# Patient Record
Sex: OTHER/UNKNOWN | Race: WHITE | NOT HISPANIC OR LATINO | Employment: OTHER | URBAN - METROPOLITAN AREA
[De-identification: names, ages, dates, MRNs, and addresses within clinical notes are randomized per-mention and may not be internally consistent; named-entity substitution may affect disease eponyms.]

---

## 2023-07-08 ENCOUNTER — HOSPITAL ENCOUNTER (INPATIENT)
Facility: HOSPITAL | Age: 88
LOS: 1 days | DRG: 184 | End: 2023-07-09
Attending: STUDENT IN AN ORGANIZED HEALTH CARE EDUCATION/TRAINING PROGRAM | Admitting: SURGERY
Payer: MEDICARE

## 2023-07-08 DIAGNOSIS — S32.9XXA CLOSED NONDISPLACED FRACTURE OF PELVIS, UNSPECIFIED PART OF PELVIS, INITIAL ENCOUNTER: Primary | ICD-10-CM

## 2023-07-08 DIAGNOSIS — S32.10XA CLOSED FRACTURE OF SACRUM, UNSPECIFIED PORTION OF SACRUM, INITIAL ENCOUNTER: ICD-10-CM

## 2023-07-08 DIAGNOSIS — S32.810A MULTIPLE FRACTURES OF PELVIS WITH STABLE DISRUPTION OF PELVIC RING, INITIAL ENCOUNTER FOR CLOSED FRACTURE: ICD-10-CM

## 2023-07-08 DIAGNOSIS — S22.49XA RIB FRACTURES: ICD-10-CM

## 2023-07-08 DIAGNOSIS — S32.599A: ICD-10-CM

## 2023-07-08 LAB
ABORH RETYPE: NORMAL
ALBUMIN SERPL-MCNC: 3.7 G/DL (ref 3.4–4.8)
ALBUMIN/GLOB SERPL: 1.2 RATIO (ref 1.1–2)
ALP SERPL-CCNC: 234 UNIT/L (ref 40–150)
ALT SERPL-CCNC: 11 UNIT/L (ref 0–55)
APTT PPP: 36.3 SECONDS (ref 23.2–33.7)
AST SERPL-CCNC: 19 UNIT/L (ref 5–34)
BASOPHILS # BLD AUTO: 0.02 X10(3)/MCL
BASOPHILS NFR BLD AUTO: 0.2 %
BILIRUBIN DIRECT+TOT PNL SERPL-MCNC: 1.2 MG/DL
BUN SERPL-MCNC: 12.7 MG/DL (ref 8.4–25.7)
CALCIUM SERPL-MCNC: 9.4 MG/DL (ref 8.8–10)
CHLORIDE SERPL-SCNC: 100 MMOL/L (ref 98–111)
CO2 SERPL-SCNC: 21 MMOL/L (ref 23–31)
CREAT SERPL-MCNC: 0.72 MG/DL (ref 0.73–1.18)
EOSINOPHIL # BLD AUTO: 0.13 X10(3)/MCL (ref 0–0.9)
EOSINOPHIL NFR BLD AUTO: 1.4 %
ERYTHROCYTE [DISTWIDTH] IN BLOOD BY AUTOMATED COUNT: 14.8 % (ref 11.5–17)
ETHANOL SERPL-MCNC: <10 MG/DL
GFR SERPLBLD CREATININE-BSD FMLA CKD-EPI: >60 MLS/MIN/1.73/M2
GLOBULIN SER-MCNC: 3.2 GM/DL (ref 2.4–3.5)
GLUCOSE SERPL-MCNC: 116 MG/DL (ref 75–121)
GROUP & RH: NORMAL
HCT VFR BLD AUTO: 38.6 % (ref 42–52)
HGB BLD-MCNC: 12.9 G/DL (ref 14–18)
IMM GRANULOCYTES # BLD AUTO: 0.04 X10(3)/MCL (ref 0–0.04)
IMM GRANULOCYTES NFR BLD AUTO: 0.4 %
INDIRECT COOMBS GEL: NORMAL
INR BLD: 1.75 (ref 0–1.3)
LACTATE SERPL-SCNC: 1.6 MMOL/L (ref 0.5–2.2)
LACTATE SERPL-SCNC: 1.7 MMOL/L (ref 0.5–2.2)
LYMPHOCYTES # BLD AUTO: 2.89 X10(3)/MCL (ref 0.6–4.6)
LYMPHOCYTES NFR BLD AUTO: 30.5 %
MCH RBC QN AUTO: 28 PG (ref 27–31)
MCHC RBC AUTO-ENTMCNC: 33.4 G/DL (ref 33–36)
MCV RBC AUTO: 83.7 FL (ref 80–94)
MONOCYTES # BLD AUTO: 0.4 X10(3)/MCL (ref 0.1–1.3)
MONOCYTES NFR BLD AUTO: 4.2 %
NEUTROPHILS # BLD AUTO: 5.98 X10(3)/MCL (ref 2.1–9.2)
NEUTROPHILS NFR BLD AUTO: 63.3 %
NRBC BLD AUTO-RTO: 0 %
PLATELET # BLD AUTO: 309 X10(3)/MCL (ref 130–400)
PMV BLD AUTO: 9.6 FL (ref 7.4–10.4)
POTASSIUM SERPL-SCNC: 3.5 MMOL/L (ref 3.5–5.1)
PROT SERPL-MCNC: 6.9 GM/DL (ref 5.8–7.6)
PROTHROMBIN TIME: 20.2 SECONDS (ref 12.5–14.5)
RBC # BLD AUTO: 4.61 X10(6)/MCL
SODIUM SERPL-SCNC: 136 MMOL/L (ref 132–146)
SPECIMEN OUTDATE: NORMAL
WBC # SPEC AUTO: 9.46 X10(3)/MCL (ref 4.5–11.5)

## 2023-07-08 PROCEDURE — 21400001 HC TELEMETRY ROOM

## 2023-07-08 PROCEDURE — 85730 THROMBOPLASTIN TIME PARTIAL: CPT

## 2023-07-08 PROCEDURE — 25000003 PHARM REV CODE 250

## 2023-07-08 PROCEDURE — 99285 EMERGENCY DEPT VISIT HI MDM: CPT | Mod: 25

## 2023-07-08 PROCEDURE — 82077 ASSAY SPEC XCP UR&BREATH IA: CPT | Performed by: STUDENT IN AN ORGANIZED HEALTH CARE EDUCATION/TRAINING PROGRAM

## 2023-07-08 PROCEDURE — G0390 TRAUMA RESPONS W/HOSP CRITI: HCPCS

## 2023-07-08 PROCEDURE — 86900 BLOOD TYPING SEROLOGIC ABO: CPT | Performed by: STUDENT IN AN ORGANIZED HEALTH CARE EDUCATION/TRAINING PROGRAM

## 2023-07-08 PROCEDURE — 83605 ASSAY OF LACTIC ACID: CPT

## 2023-07-08 PROCEDURE — 63600175 PHARM REV CODE 636 W HCPCS: Performed by: STUDENT IN AN ORGANIZED HEALTH CARE EDUCATION/TRAINING PROGRAM

## 2023-07-08 PROCEDURE — 11000001 HC ACUTE MED/SURG PRIVATE ROOM

## 2023-07-08 PROCEDURE — 80053 COMPREHEN METABOLIC PANEL: CPT | Performed by: STUDENT IN AN ORGANIZED HEALTH CARE EDUCATION/TRAINING PROGRAM

## 2023-07-08 PROCEDURE — 85610 PROTHROMBIN TIME: CPT

## 2023-07-08 PROCEDURE — 63600175 PHARM REV CODE 636 W HCPCS

## 2023-07-08 PROCEDURE — 25500020 PHARM REV CODE 255: Performed by: STUDENT IN AN ORGANIZED HEALTH CARE EDUCATION/TRAINING PROGRAM

## 2023-07-08 PROCEDURE — 83605 ASSAY OF LACTIC ACID: CPT | Performed by: STUDENT IN AN ORGANIZED HEALTH CARE EDUCATION/TRAINING PROGRAM

## 2023-07-08 PROCEDURE — 85025 COMPLETE CBC W/AUTO DIFF WBC: CPT | Performed by: STUDENT IN AN ORGANIZED HEALTH CARE EDUCATION/TRAINING PROGRAM

## 2023-07-08 PROCEDURE — 96374 THER/PROPH/DIAG INJ IV PUSH: CPT

## 2023-07-08 RX ORDER — OXYCODONE HYDROCHLORIDE 10 MG/1
10 TABLET ORAL EVERY 4 HOURS PRN
Status: DISCONTINUED | OUTPATIENT
Start: 2023-07-08 | End: 2023-07-09 | Stop reason: HOSPADM

## 2023-07-08 RX ORDER — ONDANSETRON 2 MG/ML
INJECTION INTRAMUSCULAR; INTRAVENOUS CODE/TRAUMA/SEDATION MEDICATION
Status: COMPLETED | OUTPATIENT
Start: 2023-07-08 | End: 2023-07-08

## 2023-07-08 RX ORDER — ACETAMINOPHEN 325 MG/1
650 TABLET ORAL EVERY 4 HOURS
Status: DISCONTINUED | OUTPATIENT
Start: 2023-07-08 | End: 2023-07-09 | Stop reason: HOSPADM

## 2023-07-08 RX ORDER — ONDANSETRON 2 MG/ML
INJECTION INTRAMUSCULAR; INTRAVENOUS
Status: DISPENSED
Start: 2023-07-08 | End: 2023-07-09

## 2023-07-08 RX ORDER — ENOXAPARIN SODIUM 100 MG/ML
40 INJECTION SUBCUTANEOUS EVERY 12 HOURS
Status: DISCONTINUED | OUTPATIENT
Start: 2023-07-08 | End: 2023-07-09

## 2023-07-08 RX ORDER — METHOCARBAMOL 500 MG/1
500 TABLET, FILM COATED ORAL 3 TIMES DAILY
Status: DISCONTINUED | OUTPATIENT
Start: 2023-07-08 | End: 2023-07-09 | Stop reason: HOSPADM

## 2023-07-08 RX ORDER — SODIUM CHLORIDE, SODIUM LACTATE, POTASSIUM CHLORIDE, CALCIUM CHLORIDE 600; 310; 30; 20 MG/100ML; MG/100ML; MG/100ML; MG/100ML
INJECTION, SOLUTION INTRAVENOUS CONTINUOUS
Status: DISCONTINUED | OUTPATIENT
Start: 2023-07-08 | End: 2023-07-09

## 2023-07-08 RX ORDER — OXYCODONE HYDROCHLORIDE 5 MG/1
5 TABLET ORAL EVERY 4 HOURS PRN
Status: DISCONTINUED | OUTPATIENT
Start: 2023-07-08 | End: 2023-07-09 | Stop reason: HOSPADM

## 2023-07-08 RX ORDER — KETOROLAC TROMETHAMINE 30 MG/ML
15 INJECTION, SOLUTION INTRAMUSCULAR; INTRAVENOUS EVERY 6 HOURS
Status: DISCONTINUED | OUTPATIENT
Start: 2023-07-09 | End: 2023-07-09 | Stop reason: HOSPADM

## 2023-07-08 RX ADMIN — SODIUM CHLORIDE, POTASSIUM CHLORIDE, SODIUM LACTATE AND CALCIUM CHLORIDE: 600; 310; 30; 20 INJECTION, SOLUTION INTRAVENOUS at 09:07

## 2023-07-08 RX ADMIN — IOPAMIDOL 100 ML: 755 INJECTION, SOLUTION INTRAVENOUS at 05:07

## 2023-07-08 RX ADMIN — ONDANSETRON 4 MG: 2 INJECTION INTRAMUSCULAR; INTRAVENOUS at 06:07

## 2023-07-08 RX ADMIN — ACETAMINOPHEN 650 MG: 325 TABLET, FILM COATED ORAL at 11:07

## 2023-07-08 RX ADMIN — SODIUM CHLORIDE, POTASSIUM CHLORIDE, SODIUM LACTATE AND CALCIUM CHLORIDE: 600; 310; 30; 20 INJECTION, SOLUTION INTRAVENOUS at 07:07

## 2023-07-08 RX ADMIN — ENOXAPARIN SODIUM 40 MG: 40 INJECTION SUBCUTANEOUS at 08:07

## 2023-07-08 RX ADMIN — METHOCARBAMOL 500 MG: 500 TABLET ORAL at 08:07

## 2023-07-08 NOTE — ED PROVIDER NOTES
Encounter Date: 7/8/2023    SCRIBE #1 NOTE: I, Deya Filiberto, am scribing for, and in the presence of,  Dante Hernandez MD. I have scribed the following portions of the note - Other sections scribed: HPI, ROS, PE.     History   No chief complaint on file.  Trauma, fall    89 year old female presents to the ED via EMS activated as a level 2 trauma from a nursing home following a ground level fall. Per EMS report, the fall occurred when the patient tripped and fell while trying to get a sheet off of a shelf. Patient reports striking the back of her head in the fall. Per EMS, the nursing home staff reported the pt at a GCS of 14 when she was found and stated that she was confused where she was. EMS report a GCS of 15. A c-collar was placed en route. Patient reports lower back pain and head pain. She denies any neck pain. The patient is on Eliquis.    The history is provided by the patient and the EMS personnel. No  was used.   Fall  The accident occurred just prior to arrival. The fall occurred while standing. The point of impact was the head. The pain is present in the back and head. Associated symptoms include back pain. Pertinent negatives include no neck pain, no fever and no abdominal pain.       Review of patient's allergies indicates:  Not on File  History reviewed. No pertinent past medical history.  History reviewed. No pertinent surgical history.  History reviewed. No pertinent family history.     Review of Systems   Constitutional:  Negative for fever.   HENT:  Negative for sore throat.         Head pain   Eyes:  Negative for visual disturbance.   Respiratory:  Negative for shortness of breath.    Cardiovascular:  Negative for chest pain.   Gastrointestinal:  Negative for abdominal pain.   Genitourinary:  Negative for dysuria.   Musculoskeletal:  Positive for back pain. Negative for joint swelling and neck pain.   Skin:  Negative for rash.   Neurological:  Negative for weakness.    Psychiatric/Behavioral:  Negative for confusion.      Physical Exam     Initial Vitals [07/08/23 1730]   BP Pulse Resp Temp SpO2   (!) 153/91 88 20 97.3 °F (36.3 °C) 97 %      MAP       --         Physical Exam    Nursing note and vitals reviewed.  Constitutional: Carmen Bourne appears well-developed and well-nourished.   HENT:   Head: Normocephalic.   3-4 cm hematoma to the left posterior scalp.   Eyes: EOM are normal. Pupils are equal, round, and reactive to light.   5 mm reactive to light bilaterally   Neck:   Normal range of motion.  Cardiovascular:  Normal rate, regular rhythm, normal heart sounds and intact distal pulses.           No murmur heard.  Pulmonary/Chest: Breath sounds normal. No respiratory distress. Carmen Bourne has no wheezes. Carmen Bourne has no rales.   Pacemaker to the left chest wall.    Abdominal: Abdomen is soft. Carmen Bourne exhibits no distension. There is no abdominal tenderness. There is no rebound.   Musculoskeletal:         General: No edema.      Cervical back: Normal range of motion.      Comments: No C,T or L-spine vertebral point tenderness to palpation, no step-offs, no deformities.  Right upper extremity:  Full range of motion of shoulder, elbow, wrist, no deformity or tenderness to palpation.  Left upper extremity: Full range of motion of shoulder, elbow, wrist, no deformity or tenderness to palpation.  Right lower extremity:  No knee, ankle, no tenderness palpation or deformity noted.  Left lower extremity: No hip, knee, ankle, no tenderness palpation or deformity noted.       Neurological: Carmen Bourne is alert. Carmen Bourne has normal strength. No cranial nerve deficit. GCS score is 15. GCS eye subscore is 4. GCS verbal subscore is 5. GCS motor subscore is 6.   Skin: Skin is warm and dry. Capillary refill takes less than 2 seconds. No rash noted. No erythema.   Psychiatric: Carmen Bourne has a normal mood and affect.       ED Course   Procedures  Labs Reviewed   COMPREHENSIVE METABOLIC  PANEL - Abnormal; Notable for the following components:       Result Value    Carbon Dioxide 21 (*)     Creatinine 0.72 (*)     Alkaline Phosphatase 234 (*)     All other components within normal limits   PROTIME-INR - Abnormal; Notable for the following components:    PT 20.2 (*)     INR 1.75 (*)     All other components within normal limits   APTT - Abnormal; Notable for the following components:    PTT 36.3 (*)     All other components within normal limits   CBC WITH DIFFERENTIAL - Abnormal; Notable for the following components:    Hgb 12.9 (*)     Hct 38.6 (*)     All other components within normal limits   LACTIC ACID, PLASMA - Normal   ALCOHOL,MEDICAL (ETHANOL) - Normal   CBC W/ AUTO DIFFERENTIAL    Narrative:     The following orders were created for panel order CBC auto differential.  Procedure                               Abnormality         Status                     ---------                               -----------         ------                     CBC with Differential[[id number]]        Abnormal            Final result                 Please view results for these tests on the individual orders.   TYPE & SCREEN   ABORH RETYPE          Imaging Results              CT Cervical Spine Without Contrast (Final result)  Result time 07/08/23 18:17:29      Final result by Renny Torres MD (07/08/23 18:17:29)                   Impression:      Spondylotic changes of the cervical spine without evidence for fracture.      Electronically signed by: Renny Torres MD  Date:    07/08/2023  Time:    18:17               Narrative:    EXAMINATION:  CT CERVICAL SPINE WITHOUT CONTRAST    CLINICAL HISTORY:  Trauma;    TECHNIQUE:  Low dose axial images, sagittal and coronal reformations were performed though the cervical spine.  Contrast was not administered.  An automated dose exposure technique was utilized.  This limits radiation does the patient.    COMPARISON:  None    FINDINGS:  Exaggerated lordotic curvature is  identified.  The vertebral heights are maintained with preservation of the intervertebral disc spaces.  No evidence for compression deformity, fracture, or subluxation.  Diffuse osteopenia is identified.  The predental space and prevertebral soft tissues are normal.  No evidence for central canal stenosis or neural foraminal narrowing.    The soft tissues of the neck are normal.  The lung apices are clear.                                       CT Head Without Contrast (Final result)  Result time 07/08/23 18:15:07      Final result by Renny Torres MD (07/08/23 18:15:07)                   Impression:      No acute abnormality.  Age-appropriate cerebral and cerebellar volume loss with ex vacuo dilatation.  Changes of microangiopathy.      Electronically signed by: Renny Torres MD  Date:    07/08/2023  Time:    18:15               Narrative:    EXAMINATION:  CT HEAD WITHOUT CONTRAST    CLINICAL HISTORY:  Trauma;    TECHNIQUE:  Low dose axial CT images obtained throughout the head without intravenous contrast. Sagittal and coronal reconstructions were performed.  An automated dose exposure technique was utilized.  This limits radiation does the patient.    COMPARISON:  None.    FINDINGS:  Intracranial compartment:    Ventricles and sulci are normal in size for age without evidence of hydrocephalus. No extra-axial blood or fluid collections.    The brain parenchyma appears normal. No parenchymal mass, hemorrhage, edema or major vascular distribution infarct.    Skull/extracranial contents (limited evaluation): No fracture. Mastoid air cells and paranasal sinuses are essentially clear.                                       CT Chest Abdomen Pelvis With Contrast (xpd) (Final result)  Result time 07/08/23 18:12:49      Final result by Renny Torres MD (07/08/23 18:12:49)                   Impression:      1. No sequela of trauma involving the chest, abdomen, and pelvis.  2. Chronic secondary findings of the hollow and solid  viscera is described above.  3. Bilateral fractures of the sacral ala without evidence for widening of the SI joints.  Also acute fracture is identified of the super peer your pubic ramus on the right adjacent to the pubic symphysis without widening of the pubic symphysis.  Likely fracture of the superior pubic ramus on the left with extension into the acetabulum.  Remote fracture of the inferior pubic ramus on the left.  4. Rib fractures on the right which appear to be acute nondisplaced.  Remote rib fractures on the left.      Electronically signed by: Renny Torres MD  Date:    07/08/2023  Time:    18:12               Narrative:    EXAMINATION:  CT CHEST ABDOMEN PELVIS WITH CONTRAST (XPD)    CLINICAL HISTORY:  Polytrauma, blunt;    TECHNIQUE:  Multiple cross-sectional images were obtained from the thoracic inlet to the pubic symphysis after the intravenous administration 100 mL of Isovue 370.  Coronal and sagittal reformatted images were obtained.  An automated dose exposure technique was utilized this limits radiation does the patient.    COMPARISON:  None    FINDINGS:  Chest:    The heart size enlarged with diffuse coronary artery calcifications.  Ectasia of the ascending thoracic aorta is identified.  Left chest wall pacer leads are identified.  Ectasia of the descending thoracic aorta with calcified atheromatous disease.  Aortic arch is grossly normal with a 4 vessel aortic arch with the vessels being tortuous.  The great vessels are patent.  Main pulmonary artery is normal caliber.  No evidence for hilar or mediastinal adenopathy.    Hyperinflation lungs with coarse interstitial markings.  No evidence for pneumothorax, laceration, or contusion.  Dependent atelectatic changes with small effusions bilaterally.  No area of consolidation.  No pulmonary mass.  No definitive nodularity.  No pleural thickening.  The trachea and airways are patent.    Abdomen/pelvis:    The liver is enlarged gallbladder is surgically  absent with likely physiologic intra and extrahepatic ductal dilatation with tortuous common duct.  Cystic duct remanent is dilated.  The spleen, adrenals, and pancreas are normal.  Kidneys demonstrate cortical thinning with Bosniak type 1 cyst.  Symmetric nephrogram is identified.    The small bowel is of normal caliber in the root of the mesentery is normal.  Colon is also normal caliber with scattered colonic diverticula.  No adjacent inflammatory changes.  Moderate to large stool burden is identified throughout the colon.  Normal appendix.    Uterus is surgically absent.  Pelvic floor relaxation is identified.  The bladder is under distended normal.  The course and caliber of the abdominal aorta is normal with atheromatous disease.  No evidence for aortic injury.  No free fluid in the abdomen pelvis.  No evidence for adenopathy.    No suspicious osseous lesions.  Fracture of the inferior pubic ramus on the left appears to be chronic.  An acute angulated fracture of the superior pubic ramus on the right is noted as well as a likely left-sided fracture involving the acetabulum.  No evidence for adjacent pelvic sidewall hematoma.  The proximal femora demonstrate degenerative changes without overt evidence for fracture.  Bilateral sacral insufficiency fractures are identified without evidence for hematomas.  The bilateral SI joints are normal.  Status post multilevel vertebral augmentation of the thoracolumbar spine.  Likely nondisplaced fractures of the 10th, 9th, and 8th ribs on the right.  Likely remote rib fractures on the left.  The soft tissues are grossly normal.                                       X-Ray Chest 1 View (Final result)  Result time 07/08/23 17:59:32      Final result by Renny Torres MD (07/08/23 17:59:32)                   Impression:      No definitive acute cardiopulmonary process.      Electronically signed by: Renny Torres MD  Date:    07/08/2023  Time:    17:59               Narrative:     EXAMINATION:  XR CHEST 1 VIEW    CLINICAL HISTORY:  r/o bleeding or hemorrhage;    TECHNIQUE:  Single frontal view of the chest was performed.    COMPARISON:  None    FINDINGS:  Heart size enlarged the pulmonary vasculature is normal.  Right chest wall pacer is identified.    Bibasilar atelectatic changes lungs without evidence for effusion.  No pneumothorax.    Prior vertebral augmentation of the thoracolumbar spine..                                       X-Ray Pelvis Routine AP (Final result)  Result time 07/08/23 17:58:43      Final result by Renny Torres MD (07/08/23 17:58:43)                   Impression:      Concern for fracture of the superior right pubic ramus.  Other secondary findings as above.      Electronically signed by: Renny Torres MD  Date:    07/08/2023  Time:    17:58               Narrative:    EXAMINATION:  XR PELVIS ROUTINE AP    CLINICAL HISTORY:  r/o bleeding or hemorrhage;    TECHNIQUE:  AP view of the pelvis was performed.    COMPARISON:  None.    FINDINGS:  Fracture of the pubic symphysis is identified on the right no other fracture deformity of the pelvis or proximal femora identified.  Evaluation of the sacral ala are limited secondary to overlying bowel gas.  Questionable cortical irregularity is identified of the left sacral ala.  Joint space narrowing is identified.  Diffuse osteopenia is noted.  The bilateral SI joints and pubic symphysis are grossly normal.                                    X-Rays:   Independently Interpreted Readings:   Other Readings:  Chest XR: Pacemaker to the left chest wall  Previous kyphoplasty  No pneumothorax    Pelvis XR: pubic rami fracture  Medications   ondansetron 4 mg/2 mL injection (  Not Given 7/8/23 1800)   iopamidoL (ISOVUE-370) injection 100 mL (100 mLs Intravenous Given 7/8/23 1754)   ondansetron injection (4 mg Intravenous Given 7/8/23 1802)     Medical Decision Making:   History:   I obtained history from: someone other than patient and EMS  provider.       <> Summary of History: Collateral from EMS, fall at nursing home, on thinners  Old Medical Records: I decided to obtain old medical records.  Old Records Summarized: records from clinic visits and records from previous admission(s).       <> Summary of Records: Reviewed old records from nursing home.  Initial Assessment:   Fall  Differential Diagnosis:   Judging by the patient's chief complaint and pertinent history, the patient has the following possible differential diagnoses, including but not limited to the following.  Some of these are deemed to be lower likelihood and some more likely based on my physical exam and history combined with possible lab work and/or imaging studies.   Please see the pertinent studies, and refer to the HPI.  Some of these diagnoses will take further evaluation to fully rule out, perhaps as an outpatient and the patient was encouraged to follow up when discharged for more comprehensive evaluation.      abrasion, contusion, fracture, traumatic ICH, TBI, concussion, spinal injury, fracture, pneumothorax, hemothorax, intrathoracic injury, intraabdominal injury, hemorrhage, laceration     Independently Interpreted Test(s):   I have ordered and independently interpreted X-rays - see prior notes.  Clinical Tests:   Lab Tests: Ordered and Reviewed  Radiological Study: Ordered and Reviewed  ED Management:    Patient is a 89-year-old female presents to the emergency department from nursing home after experiencing a fall.  See HPI.  See physical exam.  Contusion to the posterior scalp.  Patient is on anticoagulation as a result was activated as a level 2 trauma alert.  C-collar in place.  Airway breathing circulation all.  To be intact.  Chest x-ray without evidence of pneumothorax.  Pelvis x-ray with suspected fracture of the pubic rami.  CT imaging obtained which showed sacral fractures, pubic rami fracture .  Discussed with Orthopedic surgery.  Discussed with Trauma surgery  who will admit the patient.  All results discussed with the patient and family.  Answered all questions at this time.  Patient verbalized understanding agreed to plan.  Other:   I have discussed this case with another health care provider.       <> Summary of the Discussion: Discussed with Orthopedic surgery.  Discussed with surgical hospitalist.          Attending Attestation:           Physician Attestation for Scribe:  Physician Attestation Statement for Scribe #1: I, Dante Hernandez MD, reviewed documentation, as scribed by Deya De Los Santos in my presence, and it is both accurate and complete.           ED Course as of 07/17/23 1402   Sat Jul 08, 2023   1847 Paged ortho [RB]   1850 Paged trauma surgery [RB]      ED Course User Index  [RB] Deya De Los Santos          Medical Decision Making  Problems Addressed:  Closed fracture of inferior pubic ramus, unspecified laterality, initial encounter: acute illness or injury that poses a threat to life or bodily functions  Closed fracture of sacrum, unspecified portion of sacrum, initial encounter: acute illness or injury that poses a threat to life or bodily functions  Rib fractures: acute illness or injury    Amount and/or Complexity of Data Reviewed  Independent Historian: EMS     Details: Per EMS report, the fall occurred when the patient tripped and fell while trying to get a sheet off of a shelf. Patient reports striking the back of her head in the fall. Per EMS, the nursing home staff reported the pt at a GCS of 14 when she was found and stated that she was confused where she was. EMS report a GCS of 15. A c-collar was placed en route.  Labs: ordered.  Radiology: ordered and independent interpretation performed.    Risk  OTC drugs.  Prescription drug management.  Parenteral controlled substances.  Decision regarding hospitalization.            Clinical Impression:   Final diagnoses:  [S22.49XA] Rib fractures  [S32.10XA] Closed fracture of sacrum, unspecified portion of  sacrum, initial encounter  [S32.597Y] Closed fracture of inferior pubic ramus, unspecified laterality, initial encounter        ED Disposition Condition    Admit                 Dante Hernandez MD  07/17/23 5923

## 2023-07-08 NOTE — ED NOTES
C collar changed to deroyal and log roll noted with no stepoff or deformity noted. Pt c/o  L spine tenderness.  Pt c spine supported during log roll per RN. No changes to neuro after log roll noted.

## 2023-07-09 VITALS
SYSTOLIC BLOOD PRESSURE: 128 MMHG | HEART RATE: 79 BPM | WEIGHT: 110 LBS | DIASTOLIC BLOOD PRESSURE: 63 MMHG | RESPIRATION RATE: 18 BRPM | OXYGEN SATURATION: 95 % | HEIGHT: 63 IN | BODY MASS INDEX: 19.49 KG/M2 | TEMPERATURE: 98 F

## 2023-07-09 PROBLEM — S32.9XXA PELVIC FRACTURE: Status: ACTIVE | Noted: 2023-07-09

## 2023-07-09 PROBLEM — S32.810A: Status: ACTIVE | Noted: 2023-07-09

## 2023-07-09 PROBLEM — S22.49XA RIB FRACTURES: Status: ACTIVE | Noted: 2023-07-09

## 2023-07-09 LAB
ALBUMIN SERPL-MCNC: 2.9 G/DL (ref 3.4–4.8)
ALBUMIN/GLOB SERPL: 1.2 RATIO (ref 1.1–2)
ALP SERPL-CCNC: 180 UNIT/L (ref 40–150)
ALT SERPL-CCNC: 7 UNIT/L (ref 0–55)
AST SERPL-CCNC: 14 UNIT/L (ref 5–34)
BASOPHILS # BLD AUTO: 0.02 X10(3)/MCL
BASOPHILS NFR BLD AUTO: 0.4 %
BILIRUBIN DIRECT+TOT PNL SERPL-MCNC: 1 MG/DL
BUN SERPL-MCNC: 8.1 MG/DL (ref 8.4–25.7)
CALCIUM SERPL-MCNC: 8.4 MG/DL (ref 8.8–10)
CHLORIDE SERPL-SCNC: 103 MMOL/L (ref 98–111)
CO2 SERPL-SCNC: 28 MMOL/L (ref 23–31)
CREAT SERPL-MCNC: 0.61 MG/DL (ref 0.73–1.18)
EOSINOPHIL # BLD AUTO: 0.03 X10(3)/MCL (ref 0–0.9)
EOSINOPHIL NFR BLD AUTO: 0.6 %
ERYTHROCYTE [DISTWIDTH] IN BLOOD BY AUTOMATED COUNT: 15 % (ref 11.5–17)
GFR SERPLBLD CREATININE-BSD FMLA CKD-EPI: >60 MLS/MIN/1.73/M2
GLOBULIN SER-MCNC: 2.5 GM/DL (ref 2.4–3.5)
GLUCOSE SERPL-MCNC: 99 MG/DL (ref 75–121)
HCT VFR BLD AUTO: 33.9 % (ref 42–52)
HGB BLD-MCNC: 11.2 G/DL (ref 14–18)
IMM GRANULOCYTES # BLD AUTO: 0.02 X10(3)/MCL (ref 0–0.04)
IMM GRANULOCYTES NFR BLD AUTO: 0.4 %
LYMPHOCYTES # BLD AUTO: 1.6 X10(3)/MCL (ref 0.6–4.6)
LYMPHOCYTES NFR BLD AUTO: 31.4 %
MAGNESIUM SERPL-MCNC: 1.7 MG/DL (ref 1.6–2.6)
MCH RBC QN AUTO: 28.3 PG (ref 27–31)
MCHC RBC AUTO-ENTMCNC: 33 G/DL (ref 33–36)
MCV RBC AUTO: 85.6 FL (ref 80–94)
MONOCYTES # BLD AUTO: 0.34 X10(3)/MCL (ref 0.1–1.3)
MONOCYTES NFR BLD AUTO: 6.7 %
NEUTROPHILS # BLD AUTO: 3.08 X10(3)/MCL (ref 2.1–9.2)
NEUTROPHILS NFR BLD AUTO: 60.5 %
NRBC BLD AUTO-RTO: 0 %
PHOSPHATE SERPL-MCNC: 3.2 MG/DL (ref 2.3–4.7)
PLATELET # BLD AUTO: 235 X10(3)/MCL (ref 130–400)
PMV BLD AUTO: 9.4 FL (ref 7.4–10.4)
POTASSIUM SERPL-SCNC: 3.7 MMOL/L (ref 3.5–5.1)
PROT SERPL-MCNC: 5.4 GM/DL (ref 5.8–7.6)
RBC # BLD AUTO: 3.96 X10(6)/MCL
SODIUM SERPL-SCNC: 138 MMOL/L (ref 132–146)
WBC # SPEC AUTO: 5.09 X10(3)/MCL (ref 4.5–11.5)

## 2023-07-09 PROCEDURE — 85025 COMPLETE CBC W/AUTO DIFF WBC: CPT

## 2023-07-09 PROCEDURE — 63600175 PHARM REV CODE 636 W HCPCS

## 2023-07-09 PROCEDURE — 83735 ASSAY OF MAGNESIUM: CPT

## 2023-07-09 PROCEDURE — 97162 PT EVAL MOD COMPLEX 30 MIN: CPT

## 2023-07-09 PROCEDURE — 84100 ASSAY OF PHOSPHORUS: CPT

## 2023-07-09 PROCEDURE — 99223 PR INITIAL HOSPITAL CARE,LEVL III: ICD-10-PCS | Mod: ,,, | Performed by: NURSE PRACTITIONER

## 2023-07-09 PROCEDURE — 99223 1ST HOSP IP/OBS HIGH 75: CPT | Mod: ,,, | Performed by: NURSE PRACTITIONER

## 2023-07-09 PROCEDURE — 80053 COMPREHEN METABOLIC PANEL: CPT

## 2023-07-09 PROCEDURE — 25000003 PHARM REV CODE 250

## 2023-07-09 RX ORDER — LORAZEPAM 1 MG/1
1 TABLET ORAL NIGHTLY
COMMUNITY

## 2023-07-09 RX ORDER — METOPROLOL TARTRATE 25 MG/1
25 TABLET, FILM COATED ORAL 2 TIMES DAILY
COMMUNITY

## 2023-07-09 RX ORDER — TRAMADOL HYDROCHLORIDE 50 MG/1
50 TABLET ORAL EVERY 12 HOURS PRN
COMMUNITY

## 2023-07-09 RX ORDER — FAMOTIDINE 20 MG/1
20 TABLET, FILM COATED ORAL DAILY
COMMUNITY

## 2023-07-09 RX ORDER — ATORVASTATIN CALCIUM 40 MG/1
40 TABLET, FILM COATED ORAL NIGHTLY
COMMUNITY

## 2023-07-09 RX ORDER — AMLODIPINE BESYLATE 5 MG/1
5 TABLET ORAL DAILY
COMMUNITY

## 2023-07-09 RX ORDER — LOSARTAN POTASSIUM 50 MG/1
50 TABLET ORAL DAILY
COMMUNITY

## 2023-07-09 RX ORDER — FLECAINIDE ACETATE 50 MG/1
50 TABLET ORAL EVERY 12 HOURS
COMMUNITY

## 2023-07-09 RX ADMIN — KETOROLAC TROMETHAMINE 15 MG: 30 INJECTION, SOLUTION INTRAMUSCULAR; INTRAVENOUS at 06:07

## 2023-07-09 RX ADMIN — ACETAMINOPHEN 650 MG: 325 TABLET, FILM COATED ORAL at 06:07

## 2023-07-09 RX ADMIN — ACETAMINOPHEN 650 MG: 325 TABLET, FILM COATED ORAL at 01:07

## 2023-07-09 RX ADMIN — APIXABAN 2.5 MG: 2.5 TABLET, FILM COATED ORAL at 10:07

## 2023-07-09 RX ADMIN — KETOROLAC TROMETHAMINE 15 MG: 30 INJECTION, SOLUTION INTRAMUSCULAR; INTRAVENOUS at 01:07

## 2023-07-09 NOTE — PROGRESS NOTES
"   Trauma Surgery   Activation Note    Patient Name: Ni Walker  MRN: [id number]   YOB: 1929  Date: 07/09/2023    LEVEL 2 TRAUMA     Subjective:   History of present illness: Patient is a 49yoF who presented as a level 2 trauma activation from nursing home following fall from standing. Pt hit head, unknown LOC. GCS 14 when found. Pt reports head pain. Pt does take eliquis.    Primary Survey:  A Protecting airway   B Satting well on RA   C 2+ periopheral pulses   D GCS 15(E 4, V 5, M 6)    E exposed, log-rolled and examined (see below)   F BP: 149/77  HR: 79  RR: 18  Temp: 97.3 F  SpO2: 100 %     VITAL SIGNS: 24 HR MIN & MAX LAST   Temp  Min: 97.1 °F (36.2 °C)  Max: 97.3 °F (36.3 °C)  97.1 °F (36.2 °C)   BP  Min: 114/63  Max: 153/91  (!) 142/84    Pulse  Min: 60  Max: 90  80    Resp  Min: 16  Max: 20  18    SpO2  Min: 94 %  Max: 98 %  (!) 94 %      HT: 5' 3" (160 cm)  WT: 49.9 kg (110 lb)  BMI: 19.5       Scheduled Meds:   acetaminophen  650 mg Oral Q4H    enoxparin  40 mg Subcutaneous Q12H    ketorolac  15 mg Intravenous Q6H    methocarbamoL  500 mg Oral TID    ondansetron         Continuous Infusions:   lactated ringers 100 mL/hr at 07/08/23 2156     PRN Meds:oxyCODONE, oxyCODONE    ROS: unable to assess secondary to patients condition     Allergies: unable to obtain secondary to acuity of the patient  PMH: unable to obtain secondary to acuity of the patient  PSH: unable to obtain secondary to acuity of the patient  Social history: unable to obtain secondary to acuity of the patient  Objective:   Secondary Survey:   General: Well developed, well nourished, no acute distress, AAOx3  Neuro: CNII-XII grossly intact  HEENT:  Normocephalic, atraumatic, PERRL, cervical collar in place  CV:  RRR  Pulse: 2+ RP b/l, 2+ DP b/l   Resp:  Non-labored breathing, satting on room air  Extremities: Moves all 4 spontaneously and purposefully, no obvious gross deformities. Pain in R ribs and pelvis.  Back/Spine: " No bony TTP, no palpable step offs or deformities  Skin/wounds:  Warm, well perfused  Psych: Normal mood and affect.    Imaging:  CT c-spine:   Impression:       Spondylotic changes of the cervical spine without evidence for fracture.      CT head:   Impression:       No acute abnormality.  Age-appropriate cerebral and cerebellar volume loss with ex vacuo dilatation.  Changes of microangiopathy.        CT C/A/P:   Impression:       No acute abnormality.  Age-appropriate cerebral and cerebellar volume loss with ex vacuo dilatation.  Changes of microangiopathy.        XR chest:   Impression:       No definitive acute cardiopulmonary process.      XR pelvis:   Impression:       Concern for fracture of the superior right pubic ramus.  Other secondary findings as above.      Assessment & Plan:   89 yoF FFS with R ribs 8-10 fxs and pubic ramus fx.    - MMPC   - Reg diet  - PRN anti-emesis: zofran  - IS  - Ortho consulted for pelvic floor fxs  - Daily labs    Angie Davila MD   [employer] General Surgery, PGY-2

## 2023-07-09 NOTE — H&P
"   Trauma Surgery   Activation Note     Patient Name: Ni Walker  MRN: [id number]   YOB: 1929  Date: 07/09/2023     LEVEL 2 TRAUMA      Subjective:   History of present illness: Patient is a 49yoF who presented as a level 2 trauma activation from nursing home following fall from standing. Pt hit head, unknown LOC. GCS 14 when found. Pt reports head pain. Pt does take eliquis.     Primary Survey:  A Protecting airway   B Satting well on RA   C 2+ periopheral pulses   D GCS 15(E 4, V 5, M 6)    E exposed, log-rolled and examined (see below)   F BP: 149/77  HR: 79  RR: 18  Temp: 97.3 F  SpO2: 100 %      VITAL SIGNS: 24 HR MIN & MAX LAST   Temp  Min: 97.1 °F (36.2 °C)  Max: 97.3 °F (36.3 °C)  97.1 °F (36.2 °C)   BP  Min: 114/63  Max: 153/91  (!) 142/84    Pulse  Min: 60  Max: 90  80    Resp  Min: 16  Max: 20  18    SpO2  Min: 94 %  Max: 98 %  (!) 94 %       HT: 5' 3" (160 cm)  WT: 49.9 kg (110 lb)  BMI: 19.5         Scheduled Meds:   acetaminophen  650 mg Oral Q4H    enoxparin  40 mg Subcutaneous Q12H    ketorolac  15 mg Intravenous Q6H    methocarbamoL  500 mg Oral TID    ondansetron            Continuous Infusions:   lactated ringers 100 mL/hr at 07/08/23 2156      PRN Meds:oxyCODONE, oxyCODONE     ROS: unable to assess secondary to patients condition      Allergies: unable to obtain secondary to acuity of the patient  PMH: unable to obtain secondary to acuity of the patient  PSH: unable to obtain secondary to acuity of the patient  Social history: unable to obtain secondary to acuity of the patient  Objective:   Secondary Survey:   General: Well developed, well nourished, no acute distress, AAOx3  Neuro: CNII-XII grossly intact  HEENT:  Normocephalic, atraumatic, PERRL, cervical collar in place  CV:  RRR  Pulse: 2+ RP b/l, 2+ DP b/l   Resp:  Non-labored breathing, satting on room air  Extremities: Moves all 4 spontaneously and purposefully, no obvious gross deformities. Pain in R ribs and " pelvis.  Back/Spine: No bony TTP, no palpable step offs or deformities  Skin/wounds:  Warm, well perfused  Psych: Normal mood and affect.     Imaging:  CT c-spine:   Impression:       Spondylotic changes of the cervical spine without evidence for fracture.       CT head:   Impression:       No acute abnormality.  Age-appropriate cerebral and cerebellar volume loss with ex vacuo dilatation.  Changes of microangiopathy.          CT C/A/P:   Impression:       No acute abnormality.  Age-appropriate cerebral and cerebellar volume loss with ex vacuo dilatation.  Changes of microangiopathy.          XR chest:   Impression:       No definitive acute cardiopulmonary process.       XR pelvis:   Impression:       Concern for fracture of the superior right pubic ramus.  Other secondary findings as above.       Assessment & Plan:   89 yoF FFS with R ribs 8-10 fxs and pubic ramus fx.     - MMPC   - Reg diet  - PRN anti-emesis: zofran  - IS  - Ortho consulted for pelvic floor fxs  - Daily labs     Angie Daivla MD   [facility] General Surgery, PGY-2

## 2023-07-09 NOTE — CONSULTS
[facility] - Ortho Neuro  Orthopedics  Consult Note    Patient Name: Carmen Bourne  MRN: [id number]  Admission Date: 7/8/2023  Hospital Length of Stay: 1 days  Attending Provider: Marcelino Jeronimo Jr., MD  Primary Care Provider: Primary Doctor No    Patient information was obtained from patient and ER records.     Consults  Subjective:     Principal Problem:Pelvic fracture    Chief Complaint: No chief complaint on file.       HPI:  Patient is a 89-year-old female who had a trip and fall from standing height at her nursing facility.  She was brought to [facility] Emergency room for evaluation.  She complained of head pain and back pain upon presentation.  Workup revealed a pelvic ring injury.  She was admitted to trauma surgery service due to her multiple injuries.  Orthopedics was consulted to evaluate and manage her pelvic ring fractures.  She is awake and alert today.  She denies any pain to her pelvis.  She states she is been able to ambulate in her room with assistance and a walker without any problems.    No past medical history on file.    No past surgical history on file.    Review of patient's allergies indicates:  Not on File    Current Facility-Administered Medications   Medication    acetaminophen tablet 650 mg    apixaban tablet 2.5 mg    ketorolac injection 15 mg    methocarbamoL tablet 500 mg    oxyCODONE immediate release tablet 5 mg    oxyCODONE immediate release tablet Tab 10 mg     Family History    None       Tobacco Use    Smoking status: Not on file    Smokeless tobacco: Not on file   Substance and Sexual Activity    Alcohol use: Not on file    Drug use: Not on file    Sexual activity: Not on file     Review of Systems   Constitutional: Negative for chills and fever.   HENT:  Negative for congestion and hearing loss.    Eyes:  Negative for visual disturbance.   Cardiovascular:  Negative for chest pain and syncope.   Respiratory:  Negative for cough and shortness of  "breath.    Hematologic/Lymphatic: Does not bruise/bleed easily.   Skin:  Negative for color change and rash.   Gastrointestinal:  Negative for abdominal pain, nausea and vomiting.   Genitourinary:  Negative for dysuria and hematuria.   Neurological:  Negative for numbness, sensory change and weakness.   Psychiatric/Behavioral:  Negative for altered mental status.    Objective:     Vital Signs (Most Recent):  Temp: 98.2 °F (36.8 °C) (07/09/23 1151)  Pulse: 85 (07/09/23 1151)  Resp: 18 (07/09/23 1151)  BP: 123/68 (07/09/23 1151)  SpO2: 97 % (07/09/23 1151) Vital Signs (24h Range):  Temp:  [97.1 °F (36.2 °C)-98.2 °F (36.8 °C)] 98.2 °F (36.8 °C)  Pulse:  [60-90] 85  Resp:  [16-20] 18  SpO2:  [94 %-98 %] 97 %  BP: (112-153)/(61-91) 123/68     Weight: 49.9 kg (110 lb)  Height: 5' 3" (160 cm)  Body mass index is 19.49 kg/m².      Intake/Output Summary (Last 24 hours) at 7/9/2023 1239  Last data filed at 7/9/2023 0100  Gross per 24 hour   Intake --   Output 600 ml   Net -600 ml       General    Constitutional: Carmen Bourne appears well-developed and well-nourished. No distress.   HENT:   Head: Normocephalic.   Eyes: EOM are normal.   Neck: Neck supple.   Cardiovascular:  Normal rate and intact distal pulses.            Pulmonary/Chest: Effort normal. No respiratory distress.   Abdominal: Soft. Carmen Bourne exhibits no distension. There is no abdominal tenderness.   Neurological: Carmen Bourne is alert.         Pelvis and bilateral lower extremities:  Skin is intact with no open wounds.  No obvious ecchymosis.  No deformities.  No tenderness to palpation over the anterior pelvis.  No pain with passive range of motion of bilateral hips.  She has no tenderness or crepitus over the thighs, knees, lower legs, ankles.  She is no pain with range of motion of the knees or ankles.  No calf swelling or tenderness.  5/5 motor strength distally bilaterally with dorsiflexion and plantar flexion.  Palpable DP pulses.  Brisk capillary refill " distally.    Significant Labs: CBC:   Recent Labs   Lab 07/08/23  1745 07/09/23  0536   WBC 9.46 5.09   HGB 12.9* 11.2*   HCT 38.6* 33.9*    235     CMP:   Recent Labs   Lab 07/08/23  1745 07/09/23  0536    138   K 3.5 3.7   CO2 21* 28   BUN 12.7 8.1*   CREATININE 0.72* 0.61*   CALCIUM 9.4 8.4*   ALBUMIN 3.7 2.9*   BILITOT 1.2 1.0   ALKPHOS 234* 180*   AST 19 14   ALT 11 7     All pertinent labs within the past 24 hours have been reviewed.    Significant Imaging:   X-ray and CT evaluation reveals fractures of the pelvic ring in various states of healing including left-sided superior and inferior rami fractures and left-sided sacral fractures with callus formation and nondisplaced fractures of the right sacral ala and right inferior ramus    Assessment/Plan:     Active Diagnoses:    Diagnosis Date Noted POA    PRINCIPAL PROBLEM:  Pelvic fracture [S32.9XXA] 07/09/2023 Yes    Rib fractures [S22.49XA] 07/09/2023 Unknown    Multiple fractures of pelvis with stable disruption of pelvic ring, initial encounter for closed fracture [S32.810A] 07/09/2023 Unknown      Problems Resolved During this Admission:     Patient has a stable pelvic ring injury with fractures in various stages of healing noted on her CT scan.  She is asymptomatic on examination and states she has been able to ambulate with her walker and some assistance without any difficulty.  Her pelvic ring injury is stable and amenable to nonoperative treatment.  We will allow her to continue to weightbear as tolerated with a walker.  She can perform full range of motion to bilateral lower extremities.  She is okay for discharge to her nursing facility from an orthopedic standpoint once medically appropriate.  Recommend follow-up with us in office in 4 weeks for repeat x-rays and evaluation of her pelvis.      Thank you for your consult. I will follow-up with patient. Please contact us if you have any additional questions.    Magalys Rapp,  FNP  Orthopedics  [facility] - Orth[facility]

## 2023-07-09 NOTE — PROGRESS NOTES
*full consultation to follow    Patient had a fall from standing in her nursing facility.  Sustained multiple injuries including pelvic ring fracture.  She was admitted to the trauma team after evaluation in the emergency department.  X-ray and CT evaluation reveals fractures of the pelvic ring in various states of healing including left-sided superior and inferior rami fractures and left-sided sacral fractures with callus formation and nondisplaced fractures of the right sacral ala and right inferior ramus.  Nonoperative management for pelvic ring fractures.  Weight bear as tolerated with assistance.  Okay for discharge back to her nursing facility from an orthopedic standpoint with follow-up in 4 weeks for repeat x-rays.  Please call with questions or concerns.    This note/OR report was created with the assistance of  voice recognition software or phone  dictation.  There may be transcription errors as a result of using this technology however minimal. Effort has been made to assure accuracy of transcription but any obvious errors or omissions should be clarified with the author of the document.         Gray Carrizales MD  Orthopedic Trauma  [facility]

## 2023-07-09 NOTE — HOSPITAL COURSE
Patient was admitted after a ground level fall resulting in right ribs 8-10 fractures, and a pelvic fracture determined to be nonoperative by orthopedics. Of note, she takes Eliquis for a fib. Head imaging was negative. She did not require supplementary oxygen throughout her admission and met milestones for discharge on hospital day 1. She will f/u OP with orthopedics in 4 weeks.

## 2023-07-09 NOTE — PLAN OF CARE
RONI received a message from patient's nurse, Yasemin who asked about discharging patient back to [facility] ([phone]). Roni contacted the Assisted Living and spoke with Taya who stated they currently do not have a nurse who can take report. Taya suggested having the hospital nurse to give patient's son the report sheet and discharge information etc. RONI updated patient's nurse, Yasemin via phone.     RONI sent discharge order and AVS via [employer] fax.

## 2023-07-09 NOTE — PT/OT/SLP EVAL
Physical Therapy Evaluation and Discharge Note    Patient Name:  Carmen Bourne   MRN:  [id number]    Recommendations:     Discharge Recommendations: nursing facility, basic  Discharge Equipment Recommendations: none   Barriers to discharge: None    Assessment:     Carmen Bourne is a 89 y.o. adult admitted with a medical diagnosis of Pelvic fracture. .  At this time, patient is functioning at their prior level of function and does not require further acute PT services.     Recent Surgery: * No surgery found *      Plan:     During this hospitalization, patient does not require further acute PT services.  Please re-consult if situation changes.      Subjective     Chief Complaint: That she needs to use the bathroom  Patient/Family Comments/goals: To get better and leave the hospital  Pain/Comfort:       Patients cultural, spiritual, Advent conflicts given the current situation: no    Living Environment:  Lives with assistance at a nursing home  Prior to admission, patients level of function was required assistance.  Equipment used at home: walker, rolling.  DME owned (not currently used): none.  Upon discharge, patient will have assistance from NH staff.    Objective:     Communicated with nurse prior to session.  Patient found supine on a bedpan having a discussion with nursing staff about wanting to use the toilet instead upon PT entry to room.    General Precautions: Standard,      Orthopedic Precautions:    Braces:    Respiratory Status: Room air  Blood Pressure:     Exams:  RLE ROM: WFL  RLE Strength: 3+/5 grossly  LLE ROM: WFL  LLE Strength: 3+/5 grossly    Functional Mobility:  Bed Mobility:     Supine to Sit: contact guard assistance  Sit to Supine: contact guard assistance  Transfers:     Sit to Stand:  contact guard assistance with rolling walker  Toilet Transfer: minimum assistance with  rolling walker  using  Step Transfer. Assistance required with maneuvering turning and to ensure safety, pt never lost  balance.   Gait: Pt ambulates 10 ft with RW with CGA.     AM-PAC 6 CLICK MOBILITY  Total Score:        Treatment and Education:      Patient provided with verbal education regarding POC.  Understanding was verbalized, however additional teaching warranted.     Patient left HOB elevated with all lines intact, call button in reach, and nurse notified.    GOALS:   Multidisciplinary Problems       Physical Therapy Goals          Problem: Physical Therapy    Goal Priority Disciplines Outcome Goal Variances Interventions   Physical Therapy Goal     PT, PT/OT                          History:     No past medical history on file.    No past surgical history on file.    Time Tracking:     PT Received On: 07/09/23  PT Start Time: 1130     PT Stop Time: 1150  PT Total Time (min): 20 min     Billable Minutes: Evaluation 20 07/09/2023

## 2023-07-09 NOTE — NURSING
Nurses Note -- 4 Eyes      7/9/2023   4:27 AM      Skin assessed during: Admit      [x] No Altered Skin Integrity Present    [x]Prevention Measures Documented      [] Yes- Altered Skin Integrity Present or Discovered   [] LDA Added if Not in Epic (Describe Wound)   [] New Altered Skin Integrity was Present on Admit and Documented in LDA   [] Wound Image Taken    Wound Care Consulted? No    Attending Nurse:  Denis Deleon RN     Second RN/Staff Member:  Jyoti Guadalupe RN

## 2023-07-09 NOTE — DISCHARGE SUMMARY
[facility] Neuro  Trauma Surgery  Discharge Summary      Patient Name: Carmen Bourne  MRN: [id number]  Admission Date: 7/8/2023  Hospital Length of Stay: 1 days  Discharge Date and Time:  07/09/2023 6:48 PM  Attending Physician: No att. providers found   Discharging Provider: Morales New MD  Primary Care Provider: Primary Doctor Barbie    HPI:   No notes on file    * No surgery found *      Indwelling Lines/Drains at time of discharge:   Lines/Drains/Airways     None               Hospital Course: Patient was admitted after a ground level fall resulting in right ribs 8-10 fractures, and a pelvic fracture determined to be nonoperative by orthopedics. Of note, she takes Eliquis for a fib. Head imaging was negative. She did not require supplementary oxygen throughout her admission and met milestones for discharge on hospital day 1. She will f/u OP with orthopedics in 4 weeks.      Goals of Care Treatment Preferences:  Code Status: DNR    Living Will: Yes              Consults:   Consults (From admission, onward)        Status Ordering Provider     Inpatient consult to Orthopedics  Once        Provider:  DO Annie Benjamin ANNIE          Significant Diagnostic Studies: Radiology:   CT CAP:   1. No sequela of trauma involving the chest, abdomen, and pelvis.   2. Chronic secondary findings of the hollow and solid viscera is described above.   3. Bilateral fractures of the sacral ala without evidence for widening of the SI joints.  Also acute fracture is identified of the super peer your pubic ramus on the right adjacent to the pubic symphysis without widening of the pubic symphysis.  Likely fracture of the superior pubic ramus on the left with extension into the acetabulum.  Remote fracture of the inferior pubic ramus on the left.   4. Rib fractures on the right which appear to be acute nondisplaced.  Remote rib fractures on the left.     CT Head:   No acute abnormality.   Age-appropriate cerebral and cerebellar volume loss with ex vacuo dilatation.  Changes of microangiopathy        Pending Diagnostic Studies:     Procedure Component Value Units Date/Time    Drug Screen, Urine [[id number]]     Order Status: Sent Lab Status: No result     Specimen: Urine     Urinalysis, Reflex to Urine Culture [[id number]]     Order Status: Sent Lab Status: No result     Specimen: Urine         Final Active Diagnoses:    Diagnosis Date Noted POA    PRINCIPAL PROBLEM:  Pelvic fracture [S32.9XXA] 07/09/2023 Yes    Rib fractures [S22.49XA] 07/09/2023 Unknown    Multiple fractures of pelvis with stable disruption of pelvic ring, initial encounter for closed fracture [S32.810A] 07/09/2023 Yes      Problems Resolved During this Admission:      Discharged Condition: fair    Disposition: Nursing Facility    Follow Up:   Follow-up Information     Gray Carrizales MD Follow up in 4 week(s).    Specialty: Orthopedic Surgery  Why: repeat X-Rays  Contact information:  [address] [place] [id number] [phone]                       Patient Instructions:      Diet Adult Regular     Activity as tolerated     Medications:  Reconciled Home Medications:      Medication List      CONTINUE taking these medications    amLODIPine 5 MG tablet  Commonly known as: NORVASC  Take 5 mg by mouth once daily.     atorvastatin 40 MG tablet  Commonly known as: LIPITOR  Take 40 mg by mouth every evening.     ELIQUIS 2.5 mg Tab  Generic drug: apixaban  Take by mouth 2 (two) times daily.     famotidine 20 MG tablet  Commonly known as: PEPCID  Take 20 mg by mouth once daily.     flecainide 50 MG Tab  Commonly known as: TAMBOCOR  Take 50 mg by mouth every 12 (twelve) hours.     LORazepam 1 MG tablet  Commonly known as: ATIVAN  Take 1 mg by mouth every evening.     losartan 50 MG tablet  Commonly known as: COZAAR  Take 50 mg by mouth once daily.     metoprolol tartrate 25 MG tablet  Commonly known as: LOPRESSOR  Take 25  mg by mouth 2 (two) times daily.     traMADoL 50 mg tablet  Commonly known as: ULTRAM  Take 50 mg by mouth every 12 (twelve) hours as needed for Pain.          Morales New MD  Trauma Surgery  7/9/2023 6:51 PM

## 2023-07-09 NOTE — TERTIARY TRAUMA SURVEY NOTE
TERTIARY TRAUMA SURVEY (TTS)    List Injuries Identified to Date:   1. Nondisplaced pelvic fracture  2. R ribs 8-10 fxs    List Operations and Procedures:   1. none    No past surgical history on file.    Incidental findings:   1. none    Past Medical History:   1. A fib on eliquis    Active Ambulatory Problems     Diagnosis Date Noted    No Active Ambulatory Problems     Resolved Ambulatory Problems     Diagnosis Date Noted    No Resolved Ambulatory Problems     No Additional Past Medical History     No past medical history on file.    Tertiary Physical Exam:     Physical Exam  Vitals reviewed.   Constitutional:       Appearance: Normal appearance. Carmen Bourne is not toxic-appearing.   HENT:      Head: Normocephalic.   Eyes:      Extraocular Movements: Extraocular movements intact.   Cardiovascular:      Rate and Rhythm: Normal rate and regular rhythm.      Comments: Pacemaker visible and palpable  Pulmonary:      Effort: Pulmonary effort is normal.   Abdominal:      General: Abdomen is flat.      Palpations: Abdomen is soft.      Tenderness: There is no abdominal tenderness.   Musculoskeletal:         General: Normal range of motion.      Cervical back: Normal range of motion.   Skin:     General: Skin is warm and dry.   Neurological:      General: No focal deficit present.      Mental Status: Carmen Bourne is alert and oriented to person, place, and time.   Psychiatric:         Mood and Affect: Mood normal.       Imaging Review:     Imaging Results              CT Cervical Spine Without Contrast (Final result)  Result time 07/08/23 18:17:29      Final result by Renny Torres MD (07/08/23 18:17:29)                   Impression:      Spondylotic changes of the cervical spine without evidence for fracture.      Electronically signed by: Renny Torres MD  Date:    07/08/2023  Time:    18:17               Narrative:    EXAMINATION:  CT CERVICAL SPINE WITHOUT CONTRAST    CLINICAL HISTORY:  Trauma;    TECHNIQUE:  Low dose  axial images, sagittal and coronal reformations were performed though the cervical spine.  Contrast was not administered.  An automated dose exposure technique was utilized.  This limits radiation does the patient.    COMPARISON:  None    FINDINGS:  Exaggerated lordotic curvature is identified.  The vertebral heights are maintained with preservation of the intervertebral disc spaces.  No evidence for compression deformity, fracture, or subluxation.  Diffuse osteopenia is identified.  The predental space and prevertebral soft tissues are normal.  No evidence for central canal stenosis or neural foraminal narrowing.    The soft tissues of the neck are normal.  The lung apices are clear.                                       CT Head Without Contrast (Final result)  Result time 07/08/23 18:15:07      Final result by Renny Torres MD (07/08/23 18:15:07)                   Impression:      No acute abnormality.  Age-appropriate cerebral and cerebellar volume loss with ex vacuo dilatation.  Changes of microangiopathy.      Electronically signed by: Renny Torres MD  Date:    07/08/2023  Time:    18:15               Narrative:    EXAMINATION:  CT HEAD WITHOUT CONTRAST    CLINICAL HISTORY:  Trauma;    TECHNIQUE:  Low dose axial CT images obtained throughout the head without intravenous contrast. Sagittal and coronal reconstructions were performed.  An automated dose exposure technique was utilized.  This limits radiation does the patient.    COMPARISON:  None.    FINDINGS:  Intracranial compartment:    Ventricles and sulci are normal in size for age without evidence of hydrocephalus. No extra-axial blood or fluid collections.    The brain parenchyma appears normal. No parenchymal mass, hemorrhage, edema or major vascular distribution infarct.    Skull/extracranial contents (limited evaluation): No fracture. Mastoid air cells and paranasal sinuses are essentially clear.                                       CT Chest Abdomen  Pelvis With Contrast (xpd) (Final result)  Result time 07/08/23 18:12:49      Final result by Renny Torres MD (07/08/23 18:12:49)                   Impression:      1. No sequela of trauma involving the chest, abdomen, and pelvis.  2. Chronic secondary findings of the hollow and solid viscera is described above.  3. Bilateral fractures of the sacral ala without evidence for widening of the SI joints.  Also acute fracture is identified of the super peer your pubic ramus on the right adjacent to the pubic symphysis without widening of the pubic symphysis.  Likely fracture of the superior pubic ramus on the left with extension into the acetabulum.  Remote fracture of the inferior pubic ramus on the left.  4. Rib fractures on the right which appear to be acute nondisplaced.  Remote rib fractures on the left.      Electronically signed by: Renny Torres MD  Date:    07/08/2023  Time:    18:12               Narrative:    EXAMINATION:  CT CHEST ABDOMEN PELVIS WITH CONTRAST (XPD)    CLINICAL HISTORY:  Polytrauma, blunt;    TECHNIQUE:  Multiple cross-sectional images were obtained from the thoracic inlet to the pubic symphysis after the intravenous administration 100 mL of Isovue 370.  Coronal and sagittal reformatted images were obtained.  An automated dose exposure technique was utilized this limits radiation does the patient.    COMPARISON:  None    FINDINGS:  Chest:    The heart size enlarged with diffuse coronary artery calcifications.  Ectasia of the ascending thoracic aorta is identified.  Left chest wall pacer leads are identified.  Ectasia of the descending thoracic aorta with calcified atheromatous disease.  Aortic arch is grossly normal with a 4 vessel aortic arch with the vessels being tortuous.  The great vessels are patent.  Main pulmonary artery is normal caliber.  No evidence for hilar or mediastinal adenopathy.    Hyperinflation lungs with coarse interstitial markings.  No evidence for pneumothorax,  laceration, or contusion.  Dependent atelectatic changes with small effusions bilaterally.  No area of consolidation.  No pulmonary mass.  No definitive nodularity.  No pleural thickening.  The trachea and airways are patent.    Abdomen/pelvis:    The liver is enlarged gallbladder is surgically absent with likely physiologic intra and extrahepatic ductal dilatation with tortuous common duct.  Cystic duct remanent is dilated.  The spleen, adrenals, and pancreas are normal.  Kidneys demonstrate cortical thinning with Bosniak type 1 cyst.  Symmetric nephrogram is identified.    The small bowel is of normal caliber in the root of the mesentery is normal.  Colon is also normal caliber with scattered colonic diverticula.  No adjacent inflammatory changes.  Moderate to large stool burden is identified throughout the colon.  Normal appendix.    Uterus is surgically absent.  Pelvic floor relaxation is identified.  The bladder is under distended normal.  The course and caliber of the abdominal aorta is normal with atheromatous disease.  No evidence for aortic injury.  No free fluid in the abdomen pelvis.  No evidence for adenopathy.    No suspicious osseous lesions.  Fracture of the inferior pubic ramus on the left appears to be chronic.  An acute angulated fracture of the superior pubic ramus on the right is noted as well as a likely left-sided fracture involving the acetabulum.  No evidence for adjacent pelvic sidewall hematoma.  The proximal femora demonstrate degenerative changes without overt evidence for fracture.  Bilateral sacral insufficiency fractures are identified without evidence for hematomas.  The bilateral SI joints are normal.  Status post multilevel vertebral augmentation of the thoracolumbar spine.  Likely nondisplaced fractures of the 10th, 9th, and 8th ribs on the right.  Likely remote rib fractures on the left.  The soft tissues are grossly normal.                                       X-Ray Chest 1 View  (Final result)  Result time 07/08/23 17:59:32      Final result by Renny Torres MD (07/08/23 17:59:32)                   Impression:      No definitive acute cardiopulmonary process.      Electronically signed by: Renny Torres MD  Date:    07/08/2023  Time:    17:59               Narrative:    EXAMINATION:  XR CHEST 1 VIEW    CLINICAL HISTORY:  r/o bleeding or hemorrhage;    TECHNIQUE:  Single frontal view of the chest was performed.    COMPARISON:  None    FINDINGS:  Heart size enlarged the pulmonary vasculature is normal.  Right chest wall pacer is identified.    Bibasilar atelectatic changes lungs without evidence for effusion.  No pneumothorax.    Prior vertebral augmentation of the thoracolumbar spine..                                       X-Ray Pelvis Routine AP (Final result)  Result time 07/08/23 17:58:43      Final result by Renny Torres MD (07/08/23 17:58:43)                   Impression:      Concern for fracture of the superior right pubic ramus.  Other secondary findings as above.      Electronically signed by: Renny Torres MD  Date:    07/08/2023  Time:    17:58               Narrative:    EXAMINATION:  XR PELVIS ROUTINE AP    CLINICAL HISTORY:  r/o bleeding or hemorrhage;    TECHNIQUE:  AP view of the pelvis was performed.    COMPARISON:  None.    FINDINGS:  Fracture of the pubic symphysis is identified on the right no other fracture deformity of the pelvis or proximal femora identified.  Evaluation of the sacral ala are limited secondary to overlying bowel gas.  Questionable cortical irregularity is identified of the left sacral ala.  Joint space narrowing is identified.  Diffuse osteopenia is noted.  The bilateral SI joints and pubic symphysis are grossly normal.                                       Lab Review:   CBC:  Recent Labs   Lab Result Units 07/08/23  1745 07/09/23  0536   WBC x10(3)/mcL 9.46 5.09   RBC x10(6)/mcL 4.61 3.96   Hgb g/dL 12.9* 11.2*   Hct % 38.6* 33.9*   Platelet x10(3)/mcL  309 235   MCV fL 83.7 85.6   MCH pg 28.0 28.3   MCHC g/dL 33.4 33.0       CMP:  Recent Labs   Lab Result Units 07/08/23  1745 07/09/23  0536   Calcium Level Total mg/dL 9.4 8.4*   Albumin Level g/dL 3.7 2.9*   Sodium Level mmol/L 136 138   Potassium Level mmol/L 3.5 3.7   Carbon Dioxide mmol/L 21* 28   Blood Urea Nitrogen mg/dL 12.7 8.1*   Creatinine mg/dL 0.72* 0.61*   Alkaline Phosphatase unit/L 234* 180*   Alanine Aminotransferase unit/L 11 7   Aspartate Aminotransferase unit/L 19 14   Bilirubin Total mg/dL 1.2 1.0       Troponin:  No results for input(s): TROPONINI in the last 2160 hours.    ETOH:  Recent Labs     07/08/23 1745   ETHANOL <10.0        Urine Drug Screen:  No results for input(s): COCAINE, OPIATE, BARBITURATE, AMPHETAMINE, FENTANYL, CANNABINOIDS, MDMA in the last 72 hours.    Invalid input(s): BENZODIAZEPINE, PHENCYCLIDINE   Plan:   89 yoF FFS on eliquis with R ribs 8-10 fxs and pubic ramus fxs. All other imaging negative.    - okay for discharge to nursing home  - PT  - f/u with ortho 4 weeks    Morales New MD  Trauma Surgery  7/9/2023 1:02 PM

## 2023-07-09 NOTE — NURSING
Report called to CHIDI Gatica at [facility] at 16:50. Patient being transferred back to facility by son, Sidney. Discharge instructions given and patient and son verbalize understanding.

## 2023-07-10 NOTE — PLAN OF CARE
BILL PART B ONLY    Weekend admit/dc. [employer] denied IP. Dr. Ash said bill part B only post dc. Form submitted. Closed, denied IP. CR